# Patient Record
Sex: FEMALE | Race: ASIAN | HISPANIC OR LATINO
[De-identification: names, ages, dates, MRNs, and addresses within clinical notes are randomized per-mention and may not be internally consistent; named-entity substitution may affect disease eponyms.]

---

## 2023-01-10 ENCOUNTER — APPOINTMENT (OUTPATIENT)
Dept: ORTHOPEDIC SURGERY | Facility: CLINIC | Age: 58
End: 2023-01-10

## 2023-01-10 VITALS — WEIGHT: 154 LBS | HEIGHT: 65 IN | BODY MASS INDEX: 25.66 KG/M2

## 2023-01-10 PROBLEM — Z00.00 ENCOUNTER FOR PREVENTIVE HEALTH EXAMINATION: Status: ACTIVE | Noted: 2023-01-10

## 2023-01-11 ENCOUNTER — APPOINTMENT (OUTPATIENT)
Dept: ORTHOPEDIC SURGERY | Facility: CLINIC | Age: 58
End: 2023-01-11
Payer: MEDICAID

## 2023-01-11 VITALS — BODY MASS INDEX: 25.66 KG/M2 | HEIGHT: 65 IN | WEIGHT: 154 LBS

## 2023-01-11 DIAGNOSIS — M79.641 PAIN IN RIGHT HAND: ICD-10-CM

## 2023-01-11 PROCEDURE — 99203 OFFICE O/P NEW LOW 30 MIN: CPT

## 2023-01-11 PROCEDURE — 73130 X-RAY EXAM OF HAND: CPT | Mod: RT

## 2023-01-11 RX ORDER — MELOXICAM 15 MG/1
15 TABLET ORAL DAILY
Qty: 30 | Refills: 2 | Status: ACTIVE | COMMUNITY
Start: 2023-01-11 | End: 1900-01-01

## 2023-01-11 NOTE — PHYSICAL EXAM
[de-identified] :   Physical exam of her right wrist:  Mild swelling.  Negative ecchymosis.  Nontender over the distal radius or distal ulna.  Negative anatomical snuffbox tenderness.  She has pain in the digits.  She cannot make a full fist secondary to the pain.  Decreased  strength secondary to pain.  The sensory and motor are intact.

## 2023-01-11 NOTE — HISTORY OF PRESENT ILLNESS
[de-identified] : Patient is a 57 year old female here for evaluation of her right hand.  She has been complaining of pain for the last 2 or 3 weeks.  She denies any new or recent trauma.  She is accompanied by her daughter.  She states the pain radiates up and inter her neck.

## 2023-01-11 NOTE — DISCUSSION/SUMMARY
[de-identified] :   She is going to ice and rest her wrist and hand.\par I placed her in a cock-up wrist brace for immobilization and support.\par I prescribed the patient Mobic 15 mg 1 pill once a day for the pain and inflammation.\par I recommend an EMG to rule out carpal tunnel syndrome.  She will contact the office after the EMG for the results.\par She will see Dr. Warren in 4-6 weeks.\par I also recommend that she sees Dr. Dajuan Mcnair for evaluation of her neck.\par All questions were answered today.

## 2023-01-11 NOTE — DATA REVIEWED
[FreeTextEntry1] :   X-rays taken the office today for right hand show no acute displaced fractures or bony abnormalities.

## 2023-01-20 ENCOUNTER — APPOINTMENT (OUTPATIENT)
Dept: NEUROLOGY | Facility: CLINIC | Age: 58
End: 2023-01-20
Payer: MEDICAID

## 2023-01-20 PROCEDURE — 95911 NRV CNDJ TEST 9-10 STUDIES: CPT

## 2023-01-20 PROCEDURE — 95886 MUSC TEST DONE W/N TEST COMP: CPT

## 2023-01-27 ENCOUNTER — APPOINTMENT (OUTPATIENT)
Dept: ORTHOPEDIC SURGERY | Facility: CLINIC | Age: 58
End: 2023-01-27

## 2023-02-08 ENCOUNTER — APPOINTMENT (OUTPATIENT)
Dept: ORTHOPEDIC SURGERY | Facility: CLINIC | Age: 58
End: 2023-02-08
Payer: MEDICAID

## 2023-02-08 PROCEDURE — 72050 X-RAY EXAM NECK SPINE 4/5VWS: CPT

## 2023-02-08 PROCEDURE — 99213 OFFICE O/P EST LOW 20 MIN: CPT

## 2023-02-08 NOTE — DISCUSSION/SUMMARY
[de-identified] : 58-year-old female presents with cervical radiculopathy.  I have advised her to see her pain management Dr. Hernandez to discuss cervical epidural injections.  I also told her to bring her CD of her MRI.  I will see her back in 3 months to check on her progress.

## 2023-02-08 NOTE — HISTORY OF PRESENT ILLNESS
[de-identified] : 58-year-old female presents to me with neck pain this has been going on for over 4 weeks now.  The patient has pain that radiates from her neck down to her right arm to her fingers.  She has a history of carpal tunnel release in the past.  She has numbness and tingling in her hand as well.  Both on the right and the left.  Denies loss of bladder or bowel.  Denies balance issues.  Denies loss of hand dexterity.  She has never had cervical injections.  She did an MRI of her cervical spine done recently but does not have the CD with her.

## 2023-02-08 NOTE — DATA REVIEWED
[FreeTextEntry1] : AP lateral flexion-extension cervical x-rays obtained today in the office demonstrate no instability flexion-extension minimal degeneration of the disc.

## 2023-02-08 NOTE — PHYSICAL EXAM
[de-identified] : TTP midline cervical spine and paraspinal musculature \par \par Strength                                                                    \par Deltoid\par   Right: 5/5; Left: 5/5                     \par Biceps\par   Right: 5/5; Left: 5/5                  \par Triceps     \par   Right: 5/5; Left: 5/5                                \par Wrist Extensors  \par   Right: 5/5; Left: 5/5\par Finger Flexors  \par   Right: 5/5; Left: 5/5\par IO \par   Right: 5/5; Left: 5/5\par \par Sensation\par C5\par   Right: 2/2; Left: 2/2\par C6\par   Right: 2/2; Left: 2/2\par C7\par   Right: 2/2; Left: 2/2\par C8\par   Right: 2/2; Left: 2/2\par T1\par   Right: 2/2; Left: 2/2\par \par Reflexes\par Biceps\par   Right: 2+; Left 2+\par Triceps\par   Right: 2+; Left 2+\par Ford's\par  Right: Negative; L: Negative\par

## 2023-02-21 ENCOUNTER — APPOINTMENT (OUTPATIENT)
Dept: PAIN MANAGEMENT | Facility: CLINIC | Age: 58
End: 2023-02-21
Payer: MEDICAID

## 2023-02-21 VITALS — BODY MASS INDEX: 25.66 KG/M2 | HEIGHT: 65 IN | WEIGHT: 154 LBS

## 2023-02-21 PROCEDURE — 96136 PSYCL/NRPSYC TST PHY/QHP 1ST: CPT | Mod: 59

## 2023-02-21 PROCEDURE — 99204 OFFICE O/P NEW MOD 45 MIN: CPT | Mod: 25

## 2023-02-21 NOTE — ASSESSMENT
[FreeTextEntry1] : 58 year old female presenting with severe cervical radicular pain. Patient is presenting with radicular pain with impairment in ADLs and functionality.  The pain has not responded to conservative care, including medications, stretching, as well as active modalities, such as physical therapy.  Imaging studies as well as physical exam findings corroborate the symptomatology and radicular pain.  We will proceed with an epidural at this point. I will proceed with a cervical epidural steroid injection with sedation. Follow up in 6 weeks will be made for reassessment. I have explained the findings to the patient and all questions have been answered. \par \par Patient had a MRI that shows a radicular component along with pain referred into the upper extremity. Patient has trialed rehab (Home exercise, physical therapy or chiropractic care) and medications. I will schedule a C7-T1 cervical epidural steroid injection.\par \par Risk, benefits, pros and cons of procedure were explained to the patient using models and diagrams and their questions were answered. \par \par \par The patient has severe anxiety of procedures that necessitates monitored anesthesia care (MAC). The procedure performed will be close to major nerves, arteries, and spinal cord and/or joint structures. Due to the proximity of these structures, we need the patient to be still during the procedure.  With the help of MAC, this will be safely achieved and decrease the risk of any complications.\par \par Neuropsychological SOAPP and PCS testing was performed as an evaluation of cognition, mood, personality, behavior to assess likelihood of addiction, misuse, other aberrant medication-related behaviors, and different thoughts and feelings that may be associated with pain. The total time spent rendering and interpreting the service was approximately 20 minutes. Results will be implemented in the appropriate care of the patient\par \par Entered by Amanda Harden, acting as scribe for Dr. Hernandez.\par  \par The documentation recorded by the scribe, in my presence, accurately reflects the service I personally performed, and the decisions made by me with my edits as appropriate.\par  \par Best Regards, \par Amadou Hernandez MD \par Board Certified, Anesthesiology \par Board Certified, Pain Medicine\par \par

## 2023-02-21 NOTE — DATA REVIEWED
[FreeTextEntry1] : AP lateral flexion-extension cervical x-rays obtained today in the office demonstrate no instability flexion-extension minimal degeneration of the disc. \par \par SOAPP: Scored a 0 , low risk.\par  \par [place] REGISTRY: Reviewed .  \par  \par UDS: No data obtained today. \par   \par Medications that trigger a UDS: Benzodiazepines (Ativan, Xanax, Valium) etc, Barbiturates, Narcotics (Avinza, Butrans, hydrocodone, Codeine, Marsha, Methadone, Morphine, MS Contin, Opana, oxycodone, Oxycontin, Suboxone etc), Pregabalin (Lyrica), Tramadol (Ultacet, Utram etc), Tapentadol, (Nucynta) and Elist Drugs (cocaine, THC, Etc.)\par  \par Risk factors: Bipolar Illness, positive for any an illicit drugs, history of any ETOH and drug abuse, any signs of diversion, Sharing Meds, selling meds. Non consistent [place] drug reporting and above 120meq of morphine\par  \par Low risk: Patient has combination of a low risk SOAP and no risk factors. UDS would be repeated randomly every quarter

## 2023-02-21 NOTE — HISTORY OF PRESENT ILLNESS
[FreeTextEntry1] : HISTORY OF PRESENT ILLNESS: Ms. Gómez is a 58 year old female complaining of neck pain. \par \par She is here with her daughter acting as .  She is presenting with severe neck pain which travels into the right arm with associated numbness, tingling and spasms.  She states she has done extensive physical therapy, trialed medications with little to no relief.\par \par The pain started after no inciting event.  The patient has had this pain for 1 month, with pain worsening over the last 10 days.  Patient describes the pain as severe.  During the last month the pain has been nearly constant with symptoms worsening no typical pattern. Pain described as shooting.  Pain is associated with numbness/pins and needles into the upper extremity.  Patient has weakness in the upper extremity. Bowel or bladder habits have not changed.\par  \par ACTIVITIES: Patient can sit 1 hours before pain starts. Patient uses no assistive walking device at this time.  Patient has difficulty performing household chores at this time.\par \par PRIOR PAIN TREATMENTS:  No prior pain treatments noted.\par \par Prior Pain Medications:  Tylenol.\par

## 2023-02-21 NOTE — PHYSICAL EXAM
[de-identified] : NECK - tenderness into the cervical paraspinals. ROM Restricted. Pain with flexion. Positive spurling maneuver on the right.

## 2023-02-24 ENCOUNTER — APPOINTMENT (OUTPATIENT)
Dept: ORTHOPEDIC SURGERY | Facility: CLINIC | Age: 58
End: 2023-02-24

## 2023-03-02 ENCOUNTER — APPOINTMENT (OUTPATIENT)
Dept: PAIN MANAGEMENT | Facility: CLINIC | Age: 58
End: 2023-03-02
Payer: MEDICAID

## 2023-03-02 PROCEDURE — 93040 RHYTHM ECG WITH REPORT: CPT | Mod: 59

## 2023-03-02 PROCEDURE — 00600 ANES PX CRV SPINE&CORD NOS: CPT | Mod: QZ,P1

## 2023-03-02 PROCEDURE — 93770 DETERMINATION VENOUS PRESS: CPT | Mod: 59

## 2023-03-02 PROCEDURE — 62321 NJX INTERLAMINAR CRV/THRC: CPT

## 2023-03-02 PROCEDURE — 94761 N-INVAS EAR/PLS OXIMETRY MLT: CPT

## 2023-03-02 PROCEDURE — 72040 X-RAY EXAM NECK SPINE 2-3 VW: CPT

## 2023-03-02 NOTE — PROCEDURE
[FreeTextEntry1] : CERVICAL EPIDURAL STEROID INJECTION\par  [FreeTextEntry3] : CERVICAL EPIDURAL STEROID INJECTION\par \par \par \par \par Date:  2023\par \par Patient: Rodo Gómez\par \par :  1965\par \par \par Preoperative Diagnosis: Cervical radiculopathy\par Postoperative Diagnosis: Cervical radiculopathy\par \par \par Procedure: \par 1. Interlaminar C7-T1 Cervical Epidural Injection under fluoroscopy\par 2. Epidurography\par 3. Fluoroscopic guidance and localization of needle\par \par \par Physician: Amadou Hernandez M.D.  \par Anesthesiologist/CRNA:  Mr. Braga\par Anesthesia: MAC. IV Sedation versed mg, fentanyl mcg\par \par \par Medical Necessity:  Failure of conservative management.\par Indication for Fluoroscopy:  This procedure requires the precise placement of the spinal needle into the epidural space.  It is the only way to accurately and safely perform the injection.\par Consent:  Though unusual, the possible complications including infection, bleeding, nerve damage, hospital admission, stroke, pneumothorax, death or failure of the procedure are theoretically possible. The patient was educated about the of the procedure and alternative therapies. All questions were answered and the patient freely gave consent to proceed.\par The patient was also told that sometimes patients will notice upper and/or lower extremity weakness immediately following the procedure due to extravasation of local anesthetic solution onto the main nerve root during the procedure. In addition, the patient was informed of other possible complications such as phrenic nerve injury, weak/heavy head, or muscle injury.  Lastly, the patient was informed that 1 to 2 weeks of perioperative discomfort following the procedure is to be expected.\par \par \par Monitoring:  Patient had continuous blood pressure, EKG, and pulse oximetry throughout the case. See nurse's notes.\par \par \par \par \par PROCEDURE NOTE: After obtaining written consent, the patient was positioned prone on the operating table. The back to her neck and upper thorax was prepped with chlora prep and draped in usual sterile fashion.  A time out was performed.  The C7-T1 interspace was identified using fluoroscopy. The skin was infiltrated with lidocaine 2% -- 1 cc for subcutaneous analgesia.  The epidural space was identified using a 18g touhy needle with a midline approach using a loss of resistance technique. 2cc omnipaque was used to define the space. A solution of 5 ml of preservative-free sterile saline and 1ml of dexamethasone 10mg, 1cc was infused with minimal pressure on the syringe into the epidural space.  The needle tract and tubing were cleared with 2ml of preservative free normal saline. The procedure was tolerated well. There was no evidence of CSF, Paresthesias nor heme.  \par \par \par Epidurogram: Distal and proximal spread was noted.\par Findings: Cervical Spine AP and oblique views with x-ray degenerative changes noted.\par \par Complications: none.  \par \par Disposition: I have examined the patient and there are no new physical findings since original presentation. The patient was discharged home with a . The discharge instruction sheet was given to the patient. Motor function was intact.\par \par \par \par \par Comment: 1st VERA today, depending on effectiveness would schedule 2nd VERA in 1-2 weeks vs caudal epidural steroid vs follow up in office. Call if any problems\par \par \par \par \par This document was signed by:\par \par Amadou Hernandez MD  \par Board Certified, Anesthesiology  \par Board Certified, Pain Medicine  \par \par \par

## 2023-03-15 ENCOUNTER — APPOINTMENT (OUTPATIENT)
Dept: PAIN MANAGEMENT | Facility: CLINIC | Age: 58
End: 2023-03-15
Payer: MEDICAID

## 2023-03-15 VITALS — HEIGHT: 65 IN | WEIGHT: 154 LBS | BODY MASS INDEX: 25.66 KG/M2

## 2023-03-15 PROCEDURE — 99214 OFFICE O/P EST MOD 30 MIN: CPT

## 2023-03-15 RX ORDER — METHYLPREDNISOLONE 4 MG/1
4 TABLET ORAL
Qty: 1 | Refills: 0 | Status: ACTIVE | COMMUNITY
Start: 2023-03-15 | End: 1900-01-01

## 2023-03-15 NOTE — ASSESSMENT
[FreeTextEntry1] : 58 year old female presenting With a flare-up of neck pain following a cervical epidural steroid injection.  To help decrease her inflammation, I will prescribe her Medrol Dosepak.  She will follow-up in 2 weeks for reassessment.\par \par Entered by Amanda Harden, acting as scribe for Dr. Hernandez.\par  \par The documentation recorded by the scribe, in my presence, accurately reflects the service I personally performed, and the decisions made by me with my edits as appropriate.\par  \par Best Regards, \par Amadou Hernandez MD \par Board Certified, Anesthesiology \par Board Certified, Pain Medicine\par \par

## 2023-03-15 NOTE — HISTORY OF PRESENT ILLNESS
[FreeTextEntry1] : HISTORY OF PRESENT ILLNESS: Ms. Gómez is a 58 year old female complaining of neck pain. \par \par She is here with her daughter acting as .  She is presenting with severe neck pain which travels into the right arm with associated numbness, tingling and spasms.  She states she has done extensive physical therapy, trialed medications with little to no relief.\par \par The pain started after no inciting event.  The patient has had this pain for 1 month, with pain worsening over the last 10 days.  Patient describes the pain as severe.  During the last month the pain has been nearly constant with symptoms worsening no typical pattern. Pain described as shooting.  Pain is associated with numbness/pins and needles into the upper extremity.  Patient has weakness in the upper extremity. Bowel or bladder habits have not changed.\par  \par ACTIVITIES: Patient can sit 1 hours before pain starts. Patient uses no assistive walking device at this time.  Patient has difficulty performing household chores at this time.\par \par PRIOR PAIN TREATMENTS:  No prior pain treatments noted.\par \par Prior Pain Medications:  Tylenol.\par \par TODAY: Since last visit, she underwent a cervical epidural steroid injection on 03/02/2023. She is now presenting with chief complaints of spasms stiffness in the neck.  She states she has nonspecific chief complaints of her head feeling heavy.  She states she also gets occasional pain traveling into the shoulders.  She denies any weakness, numbness or tingling or any other red flags.  She denies any changes in bowel or bladder incontinence.\par

## 2023-03-30 ENCOUNTER — APPOINTMENT (OUTPATIENT)
Dept: PAIN MANAGEMENT | Facility: CLINIC | Age: 58
End: 2023-03-30
Payer: MEDICAID

## 2023-03-30 VITALS — HEIGHT: 65 IN | WEIGHT: 154 LBS | BODY MASS INDEX: 25.66 KG/M2

## 2023-03-30 PROCEDURE — 99214 OFFICE O/P EST MOD 30 MIN: CPT

## 2023-03-30 RX ORDER — TRAMADOL HYDROCHLORIDE 50 MG/1
50 TABLET, COATED ORAL TWICE DAILY
Qty: 60 | Refills: 0 | Status: ACTIVE | COMMUNITY
Start: 2023-03-30 | End: 1900-01-01

## 2023-03-30 NOTE — HISTORY OF PRESENT ILLNESS
[FreeTextEntry1] : HISTORY OF PRESENT ILLNESS: Ms. Gómez is a 58 year old female complaining of neck pain. \par \par She is here with her daughter acting as . She is presenting with severe neck pain which travels into the right arm with associated numbness, tingling and spasms. She states she has done extensive physical therapy, trialed medications with little to no relief.\par \par The pain started after no inciting event. The patient has had this pain for 1 month, with pain worsening over the last 10 days. Patient describes the pain as severe. During the last month the pain has been nearly constant with symptoms worsening no typical pattern. Pain described as shooting. Pain is associated with numbness/pins and needles into the upper extremity. Patient has weakness in the upper extremity. Bowel or bladder habits have not changed.\par  \par ACTIVITIES: Patient can sit 1 hours before pain starts. Patient uses no assistive walking device at this time. Patient has difficulty performing household chores at this time.\par \par PRIOR PAIN TREATMENTS: No prior pain treatments noted.\par \par Prior Pain Medications: Tylenol.\par \par TODAY: Patient presents for follow-up, she is s/p cervical epidural steroid. She had 50% relief. She has no radicular symptoms currently. Pain located neck currently. 9/10 axial pain. Stiffness and spasms across the neck. She has trouble turning her head from side to side. No relief with Medrol Dosepak.

## 2023-03-30 NOTE — ASSESSMENT
[FreeTextEntry1] : Patient with cervical facet syndrome. We will move forward with a B/L C3-6 MBB w/mac. For symptom control I am prescribing Tramadol 50mg BID. She will follow-up s/p injection. \par \par Patient is presenting with mainly axial pain with impairment in ADLs and functionality pointing to facet joints. The pain has not responded to conservative care, including medications, stretching, as well as active modalities, such as physical therapy. Imaging studies as well as physical exam findings corroborate the symptomatology and point to the facet joints. We will proceed with diagnostic cervical medial branch blocks.\par \par RISK AND BENEFIT PARAGRAPH: Risk, benefits, pros and cons of procedure were explained to the patient using models and diagrams and their questions were answered.\par \par ANESTHESIA PARAGRAPH: The patient has severe anxiety of procedures that necessitates monitored anesthesia care (MAC). The procedure performed will be close to major nerves, arteries, and spinal cord and/or joint structures. Due to the proximity of these structures, we need the patient to be still during the procedure. With the help of MAC, this will be safely achieved and decrease the risk of any complications.\par \par Entered by Bryan Zhao, acting as scribe for Dr. Hernandez.\par  \par The documentation recorded by the scribe, in my presence, accurately reflects the service I personally performed, and the decisions made by me with my edits as appropriate.\par  \par Best Regards, \par Amadou Hernandez MD \par Board Certified, Anesthesiology \par Board Certified, Pain Medicine\par

## 2023-03-30 NOTE — PHYSICAL EXAM
[de-identified] : NECK - tenderness into the cervical paraspinals. ROM Restricted. Pain with flexion.\par Bilateral cervical facet loading positive C3-C6

## 2023-05-01 ENCOUNTER — APPOINTMENT (OUTPATIENT)
Dept: PAIN MANAGEMENT | Facility: CLINIC | Age: 58
End: 2023-05-01
Payer: MEDICAID

## 2023-05-01 PROCEDURE — 93040 RHYTHM ECG WITH REPORT: CPT | Mod: 79

## 2023-05-01 PROCEDURE — 64492 INJ PARAVERT F JNT C/T 3 LEV: CPT | Mod: LT

## 2023-05-01 PROCEDURE — 64490 INJ PARAVERT F JNT C/T 1 LEV: CPT | Mod: LT

## 2023-05-01 PROCEDURE — 00600 ANES PX CRV SPINE&CORD NOS: CPT | Mod: QZ,P1

## 2023-05-01 PROCEDURE — 64491 INJ PARAVERT F JNT C/T 2 LEV: CPT | Mod: LT

## 2023-05-01 PROCEDURE — 94761 N-INVAS EAR/PLS OXIMETRY MLT: CPT

## 2023-05-01 PROCEDURE — 93770 DETERMINATION VENOUS PRESS: CPT

## 2023-05-01 NOTE — PROCEDURE
[FreeTextEntry1] : BILATERAL MEDIAL BRANCH NERVE BLOCK- CERVICAL LEVELS C3-C6 [FreeTextEntry3] : BILATERAL MEDIAL BRANCH NERVE BLOCK- CERVICAL LEVELS C3-C6\par \par \par Date:  2023\par \par Patient: Rodo Gómez \par \par :  1965\par Preoperative Diagnosis: Bilateral Cervical facet arthropathy C3- C6\par Preoperative Diagnosis: Bilateral Cervical facet arthropathy C3- C6\par Procedure: Diagnostic medial branch nerve block of Bilateral C3-C6  under fluoroscopy\par Physician: Amadou Hernandez M.D.  \par Anesthesiologist/CRNA:  Ms. Turk\par Anesthesia: See nurses note. MAC/cold spray. IV Sedation versed 4mg, fentanyl 100mcg.  \par Medical Necessity: Facet arthropathy; intractable axial pain amenable only to medial branch nerve injections. Criteria met for a medial branch nerve neurotomy.\par Indication for Fluoroscopy:  This procedure requires the precise placement of the spinal needle into the epidural space.  It is the only way to accurately and safely perform the injection.\par \par \par Consent:  Though unusual, the possible complications including infection, bleeding, nerve damage, hospital admission, stroke, pneumothorax, death or failure of the procedure are theoretically possible. The patient was educated about the of the procedure and alternative therapies. All questions were answered and the patient freely gave consent to proceed.\par Prior to the procedure, we discussed the risks of the radiofrequency such as hematoma, infection, and nerve or spinal cord injury as well as dropped head syndrome. The patient was also told that sometimes patients will notice upper and/or lower extremity weakness immediately following the procedure due to extravasation of local anesthetic solution onto the main nerve root during the procedure. In addition, the patient was informed of other possible complications such as phrenic nerve injury, weak/heavy head, or muscle injury.  \par \par \par Monitoring:  Patient had continuous blood pressure, EKG, and pulse oximetry throughout the case. See nurse's notes.\par \par \par \par \par PROCEDURE NOTE:  \par Routine OR consent was signed and procedure risks reviewed with the patient.  The patient was positioned prone on the operating table.  A chloraprep was performed and the area surrounded by sterile drapes.  A time out was performed.  Fluoroscopy unit was positioned over the patient and images of the spine (AP views) obtained.  The entry sites for the above noted levels median branch were determined and local anesthesia with lidocaine 2%-1cc was provided.  At each level a 22guage 3 ½ inch spinal needle was placed at the level of the median branch to the facet under fluoroscopic guidance. This was performed by determining needle position based on aligning the needle point with the waist of each cervical vertebrate under tunnel vision.  A dose of lidocaine 2%, 1cc  \par was administered at each level.  The needles at each level were withdrawn following administration of the medication.  There was no significant bleeding at the site.  A dressing was placed by the assistant.\par \par \par \par \par Findings: Cervical spine AP and oblique views with x-ray degenerative changes noted.\par \par \par Complications: none.  \par \par Disposition: I have examined the patient and there are no new physical findings since the original presentation.  Sensory and motor function were intact. The patient met discharge criteria see nurses notes. The discharge instruction sheet was reviewed and given to the patient. The patient was discharged home with a .\par \par Comment:  If at least 70% relief or 50% greater than 24 hours,  would proceed to RFA. If 50% relief is less than 24 hours, would repeat to confirm for RFA. If less than 50% relief, assess for other pain generators. Call if any problems\par \par Indication for RFA: The pt had a positive response to medial branch diagnostic injections and is considered a good candidate for the thermal RF ablation procedure. The diagnostic injection provided at least 50% reduction in pain for the duration of the local anesthetic. \par The following criteria have been met: 1) failed response to at least 3 months of conservative management; 2) patient has neck pain that is non-radicular, suggesting facet joint origin supported by absence of nerve root compression documented on the medical record on H&P and radiographic evaluation; 3) minimum of 6 months has elapsed since any prior RF treatments. If prior ablation therapy has been performed, it provided at least 50% relief for minimum of 10-12 weeks; 4) no prior spinal fusion at the vertebral level being treated;\par \par \par \par \par This document was signed by:\par \par Amadou Hernandez MD  \par Board Certified, Anesthesiology  \par Board Certified, Pain Medicine  \par \par \par

## 2023-05-08 ENCOUNTER — APPOINTMENT (OUTPATIENT)
Dept: PAIN MANAGEMENT | Facility: CLINIC | Age: 58
End: 2023-05-08
Payer: MEDICAID

## 2023-05-08 VITALS — BODY MASS INDEX: 25.66 KG/M2 | WEIGHT: 154 LBS | HEIGHT: 65 IN

## 2023-05-08 PROCEDURE — 99214 OFFICE O/P EST MOD 30 MIN: CPT

## 2023-05-08 NOTE — PHYSICAL EXAM
[de-identified] : NECK - tenderness into the cervical paraspinals. ROM Restricted. Pain with flexion.\par Bilateral cervical facet loading positive C3-C6

## 2023-05-08 NOTE — HISTORY OF PRESENT ILLNESS
[FreeTextEntry1] : HISTORY OF PRESENT ILLNESS: Ms. Gómez is a 58 year old female complaining of neck pain. \par \par She is here with her daughter acting as . She is presenting with severe neck pain which travels into the right arm with associated numbness, tingling and spasms. She states she has done extensive physical therapy, trialed medications with little to no relief.\par \par The pain started after no inciting event. The patient has had this pain for 1 month, with pain worsening over the last 10 days. Patient describes the pain as severe. During the last month the pain has been nearly constant with symptoms worsening no typical pattern. Pain described as shooting. Pain is associated with numbness/pins and needles into the upper extremity. Patient has weakness in the upper extremity. Bowel or bladder habits have not changed.\par  \par ACTIVITIES: Patient can sit 1 hours before pain starts. Patient uses no assistive walking device at this time. Patient has difficulty performing household chores at this time.\par \par PRIOR PAIN TREATMENTS: No prior pain treatments noted.\par \par Prior Pain Medications: Tylenol.\par \par TODAY:  Since last visit, she underwent a bilateral C3-6 medial branch block on 05/01/2023 With 90% relief for 2 days.  She noticed improvement in mobility of the neck and better quality of life.\par \par Today, she is presenting with her usual neck pain that is returning. She states the pain is in the neck without nerve radicular component.  She states there is associated stiffness and spasms.  She has pain with any sort of rotational movements of the neck.  She currently rates pain as an 8/10 on the pain scale.  She states the pain is constant.  She has been doing home exercises and taking over-the-counter medications.

## 2023-05-08 NOTE — ASSESSMENT
[FreeTextEntry1] : 58-year-old female presenting status post bilateral C3-C6 medial branch block on 05/01/2023.  She had approximately 90% relief for 2 days.  She had improvement in mobility of the neck and better quality of life.  Today, her usual neck pain is returning. Follow up in 6 weeks will be made for reassessment. I have explained the findings to the patient and all questions have been answered. \par \par Risk, benefits, pros and cons of procedure were explained to the patient using models and diagrams and their questions were answered. \par \par \par The patient has severe anxiety of procedures that necessitates monitored anesthesia care (MAC). The procedure performed will be close to major nerves, arteries, and spinal cord and/or joint structures. Due to the proximity of these structures, we need the patient to be still during the procedure.  With the help of MAC, this will be safely achieved and decrease the risk of any complications.\par \par \par Entered by Amanda Harden, acting as scribe for Dr. Hernandez.\par  \par The documentation recorded by the scribe, in my presence, accurately reflects the service I personally performed, and the decisions made by me with my edits as appropriate.\par  \par Best Regards, \par Amadou Hernandez MD \par Board Certified, Anesthesiology \par Board Certified, Pain Medicine\par

## 2023-05-10 ENCOUNTER — APPOINTMENT (OUTPATIENT)
Dept: ORTHOPEDIC SURGERY | Facility: CLINIC | Age: 58
End: 2023-05-10

## 2023-05-15 ENCOUNTER — APPOINTMENT (OUTPATIENT)
Dept: PAIN MANAGEMENT | Facility: CLINIC | Age: 58
End: 2023-05-15

## 2023-05-16 ENCOUNTER — APPOINTMENT (OUTPATIENT)
Dept: PAIN MANAGEMENT | Facility: CLINIC | Age: 58
End: 2023-05-16
Payer: MEDICAID

## 2023-05-16 PROCEDURE — 93040 RHYTHM ECG WITH REPORT: CPT | Mod: 59

## 2023-05-16 PROCEDURE — 93770 DETERMINATION VENOUS PRESS: CPT | Mod: 59

## 2023-05-16 PROCEDURE — 64634Z: CUSTOM | Mod: LT

## 2023-05-16 PROCEDURE — 64633 DESTROY CERV/THOR FACET JNT: CPT | Mod: LT

## 2023-05-16 PROCEDURE — 72040 X-RAY EXAM NECK SPINE 2-3 VW: CPT

## 2023-05-16 PROCEDURE — 00600 ANES PX CRV SPINE&CORD NOS: CPT | Mod: QZ,P2

## 2023-05-16 PROCEDURE — 94761 N-INVAS EAR/PLS OXIMETRY MLT: CPT

## 2023-05-16 NOTE — PROCEDURE
[FreeTextEntry3] : MEDIAL BRANCH NERVE RHIZOTOMY- CERVICAL LEVELS\par \par \par \par Date:  2023\par \par Patient: Rodo Gómez\par \par :  1965\par \par \par \par \par Patient Type: Established\par Encounter Type: Continuing Active.\par \par \par Preoperative Diagnosis: Right or Left Cervical facet arthropathy C3- C6\par Preoperative Diagnosis: Right or Left Cervical facet arthropathy C3- C6\par Procedure: Neurotomy of the medial branch nerve of  Left C3-C6  under fluoroscopy\par Physician: Amadou Hernandez M.D. \par Anesthesiologist/CRNA: Mr Lorenzo\par Anesthesia: MAC\par \par \par Medical Necessity: Facet arthropathy; intractable axial pain amenable only to medial branch nerve injections. Criteria met for a medial branch nerve neurotomy.\par Indication for Fluoroscopy:  This procedure requires the precise placement of the spinal needle into the epidural space.  It is the only way to accurately and safely perform the injection.\par \par \par Consent:  Though unusual, the possible complications including infection, bleeding, nerve damage, hospital admission, stroke, pneumothorax, death or failure of the procedure are theoretically possible. The patient was educated about the of the procedure and alternative therapies. All questions were answered and the patient freely gave consent to proceed.\par Prior to the procedure, we discussed the risks of the radiofrequency such as hematoma, infection, and nerve or spinal cord injury as well as dropped head syndrome. The patient was also told that sometimes patients will notice upper and/or lower extremity weakness immediately following the procedure due to extravasation of local anesthetic solution onto the main nerve root during the procedure. In addition, the patient was informed of other possible complications such as phrenic nerve injury, weak/heavy head, or muscle injury.  Lastly, the patient was informed that 1 to 2 weeks of perioperative discomfort following the procedure is to be expected.\par \par \par Monitoring:  Patient had continuous blood pressure, EKG, and pulse oxime\par \par try throughout the case. See nurse's notes.\par \par \par \par PROCEDURE NOTE:\par \par The patient was placed in a prone position on a fluoroscopy table.  The back was prepped and draped in a sterile fashion and a C-arm fluoroscopic device was used for localization of the radiofrequency target sites. A time out was performed.  The [employer]  mm disposable Mik*-coated cannula with a 10 mm active tip was adjusted via the sizing collar so that the electrode fit just inside the inner bevel at the end of the bare metal. Prior to beginning the procedure, the internal test mode function of the radiofrequency unit was checked to make sure the machine was functioning properly. The initial target zones included the waists of the vertebral bodies. At each target site, a 1% Lidocaine solution was used to anesthetize the skin and subcutaneous tissues and the radiofrequency cannulas were placed in a parallel fashion to the x-ray beam and directed in a bulls-eye fashion down to the target zones. The cannula was then walked over the leading edge and advanced approximately 2 to 3 mm in an anterior direction. Sensory stimulation at 50 hertz was performed at each target area. Referral of the sensory stimulation was noted at less than 1 volt over the paravertebral area. Motor dissociation at 2 hertz was obtained by stimulating up to 3 times the voltage of the sensory stimulation and insuring a lack of motor movement in the upper extremities. Once the radiofrequency cannula were in correct position, a 2% Lidocaine solution was used to rapidly anesthetize the lesion site. After a thirty second delay, thermal lesions were then created at each level for 60 seconds at a temperature of 80¿ C. After the lesions were created, the cannulas were removed and sterile bandages are placed at the puncture sites. The patient tolerated the procedure well. \par \par \par Findings: Cervical spine AP and oblique views with x-ray degenerative changes noted.\par \par \par \par Complications: none. \par \par \par \par Disposition: I have examined the patient and there are no new physical findings since the original presentation.  Sensory and motor function were intact. The patient met discharge criteria see nurses notes. The discharge instruction sheet was reviewed and given to the patient. The patient was discharged home with a .\par \par \par \par Comment: RFA today, follow up in 2-4 weeks. Call if any problem.\par \par \par Amadou Hernandez MD \par Board Certified, Anesthesiology \par Board Certified, Pain Medicine\par \par

## 2023-05-17 ENCOUNTER — APPOINTMENT (OUTPATIENT)
Dept: PAIN MANAGEMENT | Facility: CLINIC | Age: 58
End: 2023-05-17
Payer: MEDICAID

## 2023-05-17 DIAGNOSIS — M47.812 SPONDYLOSIS W/OUT MYELOPATHY OR RADICULOPATHY, CERVICAL REGION: ICD-10-CM

## 2023-05-17 PROCEDURE — 93770 DETERMINATION VENOUS PRESS: CPT | Mod: 59

## 2023-05-17 PROCEDURE — 94761 N-INVAS EAR/PLS OXIMETRY MLT: CPT

## 2023-05-17 PROCEDURE — 72040 X-RAY EXAM NECK SPINE 2-3 VW: CPT

## 2023-05-17 PROCEDURE — 64634Z: CUSTOM

## 2023-05-17 PROCEDURE — 64633 DESTROY CERV/THOR FACET JNT: CPT | Mod: RT

## 2023-05-17 PROCEDURE — 93040 RHYTHM ECG WITH REPORT: CPT | Mod: 59

## 2023-05-17 NOTE — PROCEDURE
[FreeTextEntry1] : MEDIAL BRANCH NERVE RHIZOTOMY- CERVICAL LEVELS   [FreeTextEntry3] : Date:  2023\par \par Patient: Rodo Gómez\par \par :  1965\par \par \par Patient Type: Established\par Encounter Type: Continuing Active.\par \par \par Preoperative Diagnosis: Right  Cervical facet arthropathy C3- C6\par Preoperative Diagnosis: Right  Cervical facet arthropathy C3- C6\par Procedure: Neurotomy of the medial branch nerve of Right C3-C6 under fluoroscopy\par Physician: Amadou Hernandez M.D. \par Anesthesiologist/CRNA: Ms. Downs\par Anesthesia: MAC, Versed 2mg, Fentanyl 100 mcg\par \par \par Medical Necessity: Facet arthropathy; intractable axial pain amenable only to medial branch nerve injections. Criteria met for a medial branch nerve neurotomy.\par Indication for Fluoroscopy:  This procedure requires the precise placement of the spinal needle into the epidural space.  It is the only way to accurately and safely perform the injection.\par \par \par Consent:  Though unusual, the possible complications including infection, bleeding, nerve damage, hospital admission, stroke, pneumothorax, death or failure of the procedure are theoretically possible. The patient was educated about the of the procedure and alternative therapies. All questions were answered and the patient freely gave consent to proceed.\par Prior to the procedure, we discussed the risks of the radiofrequency such as hematoma, infection, and nerve or spinal cord injury as well as dropped head syndrome. The patient was also told that sometimes patients will notice upper and/or lower extremity weakness immediately following the procedure due to extravasation of local anesthetic solution onto the main nerve root during the procedure. In addition, the patient was informed of other possible complications such as phrenic nerve injury, weak/heavy head, or muscle injury.  Lastly, the patient was informed that 1 to 2 weeks of perioperative discomfort following the procedure is to be expected.\par \par \par Monitoring:  Patient had continuous blood pressure, EKG, and pulse oxime\par \par try throughout the case. See nurse's notes.\par \par \par \par PROCEDURE NOTE:\par \par The patient was placed in a prone position on a fluoroscopy table.  The back was prepped and draped in a sterile fashion and a C-arm fluoroscopic device was used for localization of the radiofrequency target sites. A time out was performed.  The [employer]  mm disposable Mik*-coated cannula with a 10 mm active tip was adjusted via the sizing collar so that the electrode fit just inside the inner bevel at the end of the bare metal. Prior to beginning the procedure, the internal test mode function of the radiofrequency unit was checked to make sure the machine was functioning properly. The initial target zones included the waists of the vertebral bodies. At each target site, a 1% Lidocaine solution was used to anesthetize the skin and subcutaneous tissues and the radiofrequency cannulas were placed in a parallel fashion to the x-ray beam and directed in a bulls-eye fashion down to the target zones. The cannula was then walked over the leading edge and advanced approximately 2 to 3 mm in an anterior direction. Sensory stimulation at 50 hertz was performed at each target area. Referral of the sensory stimulation was noted at less than 1 volt over the paravertebral area. Motor dissociation at 2 hertz was obtained by stimulating up to 3 times the voltage of the sensory stimulation and insuring a lack of motor movement in the upper extremities. Once the radiofrequency cannula were in correct position, a 2% Lidocaine solution was used to rapidly anesthetize the lesion site. After a thirty second delay, thermal lesions were then created at each level for 60 seconds at a temperature of 80¿ C. After the lesions were created, the cannulas were removed and sterile bandages are placed at the puncture sites. The patient tolerated the procedure well. \par \par \par Findings: Cervical spine AP and oblique views with x-ray degenerative changes noted.\par \par \par \par Complications: none. \par \par \par \par Disposition: I have examined the patient and there are no new physical findings since the original presentation.  Sensory and motor function were intact. The patient met discharge criteria see nurses notes. The discharge instruction sheet was reviewed and given to the patient. The patient was discharged home with a .\par \par \par \par Comment: RFA today, follow up in 2-4 weeks. Call if any problem.\par \par \par \par Board Certified, Anesthesiology \par Board Certified, Pain Medicine\par \par

## 2023-06-07 ENCOUNTER — APPOINTMENT (OUTPATIENT)
Dept: PAIN MANAGEMENT | Facility: CLINIC | Age: 58
End: 2023-06-07
Payer: MEDICAID

## 2023-06-07 ENCOUNTER — APPOINTMENT (OUTPATIENT)
Dept: ORTHOPEDIC SURGERY | Facility: CLINIC | Age: 58
End: 2023-06-07
Payer: MEDICAID

## 2023-06-07 ENCOUNTER — APPOINTMENT (OUTPATIENT)
Dept: ORTHOPEDIC SURGERY | Facility: CLINIC | Age: 58
End: 2023-06-07

## 2023-06-07 VITALS — BODY MASS INDEX: 25.66 KG/M2 | WEIGHT: 154 LBS | HEIGHT: 65 IN

## 2023-06-07 DIAGNOSIS — M54.12 RADICULOPATHY, CERVICAL REGION: ICD-10-CM

## 2023-06-07 PROCEDURE — 99214 OFFICE O/P EST MOD 30 MIN: CPT

## 2023-06-07 RX ORDER — TRAMADOL HYDROCHLORIDE 50 MG/1
50 TABLET, COATED ORAL TWICE DAILY
Qty: 60 | Refills: 0 | Status: ACTIVE | COMMUNITY
Start: 2023-06-07 | End: 1900-01-01

## 2023-06-07 NOTE — ASSESSMENT
[FreeTextEntry1] : 58-year-old female presenting with ongoing severe cervical radicular symptoms.  Injections have all been failed.  I would like to refer her back to Dr. Graff for surgical correction versus a spinal column stimulator trial.  For symptom control, I will prescribe tramadol 50 mg to be taken b.i.d..\par \par Entered by Amanda Harden, acting as scribe for Dr. Hernandez.\par  \par The documentation recorded by the scribe, in my presence, accurately reflects the service I personally performed, and the decisions made by me with my edits as appropriate.\par  \par Best Regards, \par Amadou Hernandez MD \par Board Certified, Anesthesiology \par Board Certified, Pain Medicine\par

## 2023-06-07 NOTE — PHYSICAL EXAM
[de-identified] : NECK - tenderness over the cervical paraspinals. ROM restricted. Pain with flexion. Positive spurling bilaterally.

## 2023-06-07 NOTE — HISTORY OF PRESENT ILLNESS
[FreeTextEntry1] : HISTORY OF PRESENT ILLNESS: Ms. Gómez is a 58 year old female complaining of neck pain. \par \par She is here with her daughter acting as . She is presenting with severe neck pain which travels into the right arm with associated numbness, tingling and spasms. She states she has done extensive physical therapy, trialed medications with little to no relief.\par \par The pain started after no inciting event. The patient has had this pain for 1 month, with pain worsening over the last 10 days. Patient describes the pain as severe. During the last month the pain has been nearly constant with symptoms worsening no typical pattern. Pain described as shooting. Pain is associated with numbness/pins and needles into the upper extremity. Patient has weakness in the upper extremity. Bowel or bladder habits have not changed.\par  \par ACTIVITIES: Patient can sit 1 hours before pain starts. Patient uses no assistive walking device at this time. Patient has difficulty performing household chores at this time.\par \par PRIOR PAIN TREATMENTS: No prior pain treatments noted.\par \par Prior Pain Medications: Tylenol.\par \par TODAY: Since last visit, she underwent a left C3-6 radiofrequency ablation on 05/16/2023 and a right C3-6 radiofrequency ablation on 05/17/2023 with very minimal relief. \par \par She continues today with severe neck pain.  She states the pain travels into both of her arms.  She states she has associated numbness and tingling throughout the day.\par \par

## 2023-06-07 NOTE — HISTORY OF PRESENT ILLNESS
[de-identified] : 58-year-old female presents with cervical pain.  I had seen the patient last in February 8, 2023.  At that time the patient was having pain radiating down her arms.  Right now it radiates significantly down her arms.  She has numbness and tingling down her entire arm and into her hand.  She has had epidural steroid injections with Dr. Hernandez.  Those have failed to provide relief.  Her most recent MRI has been from 2019.  She has done physical therapy in the past and taking oral medications and those did not help.  We did an x-ray at her last visit which did not reveal a source of the patient's pain.

## 2023-06-07 NOTE — DISCUSSION/SUMMARY
[de-identified] : 58-year-old female with cervical radiculopathy.  I am giving the patient a prescription for a new MRI of her cervical spine given that her last 1 was in 2019 and is not adequate for making surgical decisions.  I will see the patient back after the MRI is complete.

## 2023-06-26 ENCOUNTER — OUTPATIENT (OUTPATIENT)
Dept: OUTPATIENT SERVICES | Facility: HOSPITAL | Age: 58
LOS: 1 days | End: 2023-06-26
Payer: MEDICAID

## 2023-06-26 ENCOUNTER — RESULT REVIEW (OUTPATIENT)
Age: 58
End: 2023-06-26

## 2023-06-26 DIAGNOSIS — Z00.8 ENCOUNTER FOR OTHER GENERAL EXAMINATION: ICD-10-CM

## 2023-06-26 DIAGNOSIS — M54.12 RADICULOPATHY, CERVICAL REGION: ICD-10-CM

## 2023-06-26 PROCEDURE — 72141 MRI NECK SPINE W/O DYE: CPT

## 2023-06-26 PROCEDURE — 72141 MRI NECK SPINE W/O DYE: CPT | Mod: 26

## 2023-06-27 DIAGNOSIS — M54.12 RADICULOPATHY, CERVICAL REGION: ICD-10-CM

## 2023-07-05 ENCOUNTER — APPOINTMENT (OUTPATIENT)
Dept: PAIN MANAGEMENT | Facility: CLINIC | Age: 58
End: 2023-07-05

## 2023-07-12 ENCOUNTER — APPOINTMENT (OUTPATIENT)
Dept: ORTHOPEDIC SURGERY | Facility: CLINIC | Age: 58
End: 2023-07-12
Payer: MEDICAID

## 2023-07-12 PROCEDURE — 99213 OFFICE O/P EST LOW 20 MIN: CPT

## 2023-07-12 NOTE — HISTORY OF PRESENT ILLNESS
[de-identified] : 58-year-old female presents with continued pain down her arms.  She has an MRI of her cervical spine for me to review this was done 126.  Injections never helped her.  She is actually worse pain.

## 2023-07-12 NOTE — DISCUSSION/SUMMARY
[de-identified] : 58-year-old female with cervical radiculopathy.  She has no compression in her cervical spine MRI do not recommend any kind of decompression or fusion surgery.  I did discuss with the patient potentially trialing spinal cord stimulator with Dr. Hernandez.  She will follow-up with him.

## 2023-07-12 NOTE — DATA REVIEWED
[FreeTextEntry1] : I reviewed the MRI of the cervical spine that she is 26 2023 at [facility].  There is no clinically significant neural compression.

## 2023-07-25 ENCOUNTER — APPOINTMENT (OUTPATIENT)
Dept: PAIN MANAGEMENT | Facility: CLINIC | Age: 58
End: 2023-07-25